# Patient Record
(demographics unavailable — no encounter records)

---

## 2024-11-22 NOTE — ADDENDUM
[FreeTextEntry1] : Raul Paul assisted in documentation on Nov 22 2024 acting as a scribe for Dr. Randy Jones.

## 2024-11-22 NOTE — DISCUSSION/SUMMARY
[FreeTextEntry1] : No active issues  declined flu shot  bloods pending  [EKG obtained to assist in diagnosis and management of assessed problem(s)] : EKG obtained to assist in diagnosis and management of assessed problem(s)